# Patient Record
Sex: FEMALE | Race: WHITE | NOT HISPANIC OR LATINO | Employment: UNEMPLOYED | URBAN - METROPOLITAN AREA
[De-identification: names, ages, dates, MRNs, and addresses within clinical notes are randomized per-mention and may not be internally consistent; named-entity substitution may affect disease eponyms.]

---

## 2020-02-09 ENCOUNTER — HOSPITAL ENCOUNTER (EMERGENCY)
Facility: HOSPITAL | Age: 20
Discharge: HOME | End: 2020-02-09
Attending: EMERGENCY MEDICINE | Admitting: EMERGENCY MEDICINE
Payer: COMMERCIAL

## 2020-02-09 ENCOUNTER — APPOINTMENT (EMERGENCY)
Dept: RADIOLOGY | Facility: HOSPITAL | Age: 20
End: 2020-02-09
Payer: COMMERCIAL

## 2020-02-09 VITALS
DIASTOLIC BLOOD PRESSURE: 81 MMHG | HEIGHT: 64 IN | WEIGHT: 175 LBS | RESPIRATION RATE: 16 BRPM | BODY MASS INDEX: 29.88 KG/M2 | HEART RATE: 81 BPM | OXYGEN SATURATION: 99 % | SYSTOLIC BLOOD PRESSURE: 137 MMHG | TEMPERATURE: 98.4 F

## 2020-02-09 DIAGNOSIS — M25.531 RIGHT WRIST PAIN: Primary | ICD-10-CM

## 2020-02-09 PROCEDURE — 99283 EMERGENCY DEPT VISIT LOW MDM: CPT

## 2020-02-09 PROCEDURE — 99284 EMERGENCY DEPT VISIT MOD MDM: CPT | Performed by: EMERGENCY MEDICINE

## 2020-02-09 PROCEDURE — 73110 X-RAY EXAM OF WRIST: CPT

## 2020-02-09 PROCEDURE — 29125 APPL SHORT ARM SPLINT STATIC: CPT | Performed by: EMERGENCY MEDICINE

## 2020-02-10 NOTE — ED PROVIDER NOTES
History  Chief Complaint   Patient presents with    Wrist Pain     pt c/o right wrist pain that began a few days ago  denies injury  Ibuprofen taken approx 1 hour PTA  Right wrist pain over the past few days without any obvious injury denies any repetitive motions she has some mild swelling to the right wrist joint without any erythema warmth or sign of infection there is decreased range of motion secondary to pain pulses in gross sensation are intact      History provided by:  Patient  Medical Problem   Location:  Right wrist  Quality:  Aching pain  Severity:  Moderate  Onset quality:  Gradual  Timing:  Constant  Progression:  Unchanged  Chronicity:  New  Context:  Right wrist pain no obvious injury  Relieved by:  Nothing  Worsened by: Movement      Prior to Admission Medications   Prescriptions Last Dose Informant Patient Reported? Taking? Norgestimate-Eth Estradiol (MONO-LINYAH PO)   Yes Yes   Sig: Take by mouth      Facility-Administered Medications: None       No past medical history on file  Past Surgical History:   Procedure Laterality Date    ADENOIDECTOMY      HERNIA REPAIR      TONSILLECTOMY      WISDOM TOOTH EXTRACTION         No family history on file  I have reviewed and agree with the history as documented  Social History     Tobacco Use    Smoking status: Never Smoker    Smokeless tobacco: Never Used   Substance Use Topics    Alcohol use: Yes    Drug use: Never        Review of Systems   Musculoskeletal: Positive for arthralgias and joint swelling  All other systems reviewed and are negative  Physical Exam  Physical Exam   Constitutional: She is oriented to person, place, and time  She appears well-developed and well-nourished  No distress  HENT:   Head: Normocephalic and atraumatic  Eyes: Pupils are equal, round, and reactive to light  EOM are normal  Right eye exhibits no discharge  Left eye exhibits no discharge  Neck: Neck supple  No JVD present   No tracheal deviation present  Cardiovascular: Normal rate, regular rhythm and intact distal pulses  Pulmonary/Chest: Effort normal and breath sounds normal  No stridor  No respiratory distress  She has no wheezes  Abdominal: Soft  Bowel sounds are normal  There is no tenderness  Musculoskeletal: She exhibits edema and tenderness  She exhibits no deformity  Mild right wrist swelling with decreased range of motion secondary to pain no erythema warmth or sign of infection   Neurological: She is alert and oriented to person, place, and time  No cranial nerve deficit or sensory deficit  She exhibits normal muscle tone  Coordination normal    Skin: Skin is warm and dry  No rash noted  She is not diaphoretic  Psychiatric: She has a normal mood and affect  Her behavior is normal  Thought content normal    Nursing note and vitals reviewed  Vital Signs  ED Triage Vitals [02/09/20 2043]   Temperature Pulse Respirations Blood Pressure SpO2   98 4 °F (36 9 °C) 81 16 137/81 99 %      Temp src Heart Rate Source Patient Position - Orthostatic VS BP Location FiO2 (%)   -- -- Sitting Left arm --      Pain Score       8           Vitals:    02/09/20 2043   BP: 137/81   Pulse: 81   Patient Position - Orthostatic VS: Sitting         Visual Acuity      ED Medications  Medications - No data to display    Diagnostic Studies  Results Reviewed     None                 XR wrist 3+ views RIGHT   ED Interpretation by Kiah Chahal DO (02/09 2109)   No acute fracture or dislocation                 Procedures  Orthopedic injury treatment  Date/Time: 2/9/2020 9:18 PM  Performed by: Kiah Chahal DO  Authorized by: Kiah Chahal DO     Patient Location:  ED  Injury location:  Wrist  Location details:  Right wrist  Injury type:   Soft tissue  Neurovascular status: Neurovascularly intact    Distal perfusion: normal    Neurological function: normal    Range of motion: reduced    Skeletal traction used?: No    Immobilization:  Splint  Splint type:  Wrist  Supplies used:  Ortho-Glass  Neurovascular status: Neurovascularly intact    Distal perfusion: normal    Neurological function: normal    Range of motion: unchanged    Patient tolerance:  Patient tolerated the procedure well with no immediate complications             ED Course                               MDM      Disposition  Final diagnoses:   Right wrist pain     Time reflects when diagnosis was documented in both MDM as applicable and the Disposition within this note     Time User Action Codes Description Comment    2/9/2020  9:19 PM Manan, Nunu [facility] Right wrist pain       ED Disposition     ED Disposition Condition Date/Time Comment    Discharge Stable Sun Feb 9, 2020  9:19 PM Nasir Blackmon discharge to home/self care  Follow-up Information     Follow up With Specialties Details Why Contact Info Additional [address] [facility] Specialists [facility] Orthopedic Surgery In 1 week If symptoms worsen [address] [phone]  [address] [place] [address] Specialists [address], [address]          Patient's Medications   Discharge Prescriptions    No medications on file     No discharge procedures on file      ED Provider  Electronically Signed by           Husam Smith DO  02/09/20 1380

## 2021-03-02 ENCOUNTER — HOSPITAL ENCOUNTER (EMERGENCY)
Facility: HOSPITAL | Age: 21
Discharge: HOME | End: 2021-03-02
Attending: EMERGENCY MEDICINE | Admitting: EMERGENCY MEDICINE
Payer: COMMERCIAL

## 2021-03-02 VITALS
RESPIRATION RATE: 20 BRPM | DIASTOLIC BLOOD PRESSURE: 85 MMHG | TEMPERATURE: 98.1 F | SYSTOLIC BLOOD PRESSURE: 133 MMHG | OXYGEN SATURATION: 99 % | WEIGHT: 153 LBS | BODY MASS INDEX: 26.12 KG/M2 | HEIGHT: 64 IN | HEART RATE: 71 BPM

## 2021-03-02 DIAGNOSIS — K08.89 DENTALGIA: Primary | ICD-10-CM

## 2021-03-02 DIAGNOSIS — K02.9 DENTAL CARIES: ICD-10-CM

## 2021-03-02 PROCEDURE — 96372 THER/PROPH/DIAG INJ SC/IM: CPT

## 2021-03-02 PROCEDURE — 99283 EMERGENCY DEPT VISIT LOW MDM: CPT

## 2021-03-02 PROCEDURE — 99284 EMERGENCY DEPT VISIT MOD MDM: CPT | Performed by: PHYSICIAN ASSISTANT

## 2021-03-02 RX ORDER — AMOXICILLIN 500 MG/1
500 CAPSULE ORAL 3 TIMES DAILY
Qty: 30 CAPSULE | Refills: 0 | Status: SHIPPED | OUTPATIENT
Start: 2021-03-02 | End: 2021-03-12

## 2021-03-02 RX ORDER — IBUPROFEN 600 MG/1
600 TABLET ORAL EVERY 6 HOURS PRN
Qty: 30 TABLET | Refills: 0 | Status: SHIPPED | OUTPATIENT
Start: 2021-03-02

## 2021-03-02 RX ORDER — KETOROLAC TROMETHAMINE 30 MG/ML
30 INJECTION, SOLUTION INTRAMUSCULAR; INTRAVENOUS ONCE
Status: COMPLETED | OUTPATIENT
Start: 2021-03-02 | End: 2021-03-02

## 2021-03-02 RX ADMIN — KETOROLAC TROMETHAMINE 30 MG: 30 INJECTION, SOLUTION INTRAMUSCULAR; INTRAVENOUS at 17:57

## 2021-03-02 NOTE — DISCHARGE INSTRUCTIONS
Soft foods  Take motrin as directed  Take amoxicillin 3 times a day for next 10 days  Keep appointment with dentist next week

## 2021-03-02 NOTE — ED PROVIDER NOTES
History  Chief Complaint   Patient presents with    Dental Pain     patient presents to the ED with c/o dental pain on left side, states took tylenol 3 hours ago      Patient is a 18 y/o F that presents to the ED with left upper dental pain for 2 days  She has been taking tylenol for the pain, but it doesn't help  She has an appointment with her dentist next week and was told to call the family doctor for pain  Family doctor told patient to go to the ED  She denies fevers, chills  No trouble breathing or swallowing  No facial swelling  History provided by:  Patient  Dental Pain  Location:  Upper  Upper teeth location:  15/ZARA 2nd molar  Quality:  Aching  Severity:  Moderate  Onset quality:  Gradual  Duration:  2 days  Timing:  Constant  Progression:  Worsening  Chronicity:  New  Context: dental caries and poor dentition    Relieved by:  Nothing  Worsened by:  Nothing  Ineffective treatments:  Acetaminophen  Associated symptoms: neck swelling    Associated symptoms: no congestion, no difficulty swallowing, no drooling, no facial pain, no facial swelling, no fever, no gum swelling, no headaches and no oral lesions    Risk factors: lack of dental care        Prior to Admission Medications   Prescriptions Last Dose Informant Patient Reported? Taking? Norgestimate-Eth Estradiol (MONO-LINYAH PO)   Yes No   Sig: Take by mouth      Facility-Administered Medications: None       History reviewed  No pertinent past medical history  Past Surgical History:   Procedure Laterality Date    ADENOIDECTOMY      HERNIA REPAIR      TONSILLECTOMY      WISDOM TOOTH EXTRACTION         History reviewed  No pertinent family history  I have reviewed and agree with the history as documented  E-Cigarette/Vaping     E-Cigarette/Vaping Substances     Social History     Tobacco Use    Smoking status: Never Smoker    Smokeless tobacco: Never Used   Substance Use Topics    Alcohol use:  Yes    Drug use: Never Review of Systems   Constitutional: Negative for chills and fever  HENT: Positive for dental problem  Negative for congestion, drooling, facial swelling, mouth sores and trouble swallowing  Gastrointestinal: Negative for nausea and vomiting  Skin: Negative for color change and rash  Neurological: Negative for dizziness and headaches  All other systems reviewed and are negative  Physical Exam  Physical Exam  Vitals signs and nursing note reviewed  Constitutional:       General: She is not in acute distress  Appearance: Normal appearance  She is well-developed and well-groomed  She is not ill-appearing  HENT:      Head: Normocephalic and atraumatic  Jaw: There is normal jaw occlusion  No swelling  Right Ear: Hearing and tympanic membrane normal       Left Ear: Hearing and tympanic membrane normal       Nose: Nose normal       Mouth/Throat:      Mouth: Mucous membranes are moist       Dentition: Abnormal dentition  Dental caries present  Comments: No facial swelling  Eyes:      Conjunctiva/sclera: Conjunctivae normal       Pupils: Pupils are equal    Neck:      Musculoskeletal: Normal range of motion and neck supple  Cardiovascular:      Rate and Rhythm: Normal rate and regular rhythm  Heart sounds: Normal heart sounds  Pulmonary:      Effort: Pulmonary effort is normal       Breath sounds: Normal breath sounds  No wheezing, rhonchi or rales  Musculoskeletal: Normal range of motion  Right lower leg: No edema  Left lower leg: No edema  Lymphadenopathy:      Head:      Right side of head: No submental or submandibular adenopathy  Left side of head: No submental or submandibular adenopathy  Cervical: No cervical adenopathy  Skin:     General: Skin is warm and dry  Coloration: Skin is not pale  Findings: No rash  Neurological:      General: No focal deficit present        Mental Status: She is alert and oriented to person, place, and time  Psychiatric:         Mood and Affect: Mood normal          Behavior: Behavior is cooperative  Vital Signs  ED Triage Vitals   Temperature Pulse Respirations Blood Pressure SpO2   03/02/21 1735 03/02/21 1735 03/02/21 1735 03/02/21 1735 03/02/21 1735   98 1 °F (36 7 °C) 71 20 133/85 99 %      Temp Source Heart Rate Source Patient Position - Orthostatic VS BP Location FiO2 (%)   03/02/21 1735 03/02/21 1735 -- -- --   Temporal Monitor         Pain Score       03/02/21 1732       Worst Possible Pain           Vitals:    03/02/21 1735   BP: 133/85   Pulse: 71         Visual Acuity      ED Medications  Medications   ketorolac (TORADOL) injection 30 mg (30 mg Intramuscular Given 3/2/21 1757)       Diagnostic Studies  Results Reviewed     None                 No orders to display              Procedures  Procedures         ED Course                                           MDM  Number of Diagnoses or Management Options  Dental caries: new and does not require workup  Dentalgia: new and does not require workup  Diagnosis management comments: Patient with dental pain, has appt with dentist next week, will prescribe abx and motrin and advised f/u with dentist      Patient Progress  Patient progress: stable      Disposition  Final diagnoses:   [address] caries     Time reflects when diagnosis was documented in both MDM as applicable and the Disposition within this note     Time User Action Codes Description Comment    3/2/2021  5:42 PM Thea Garsia [[id number]] [address]     3/2/2021  5:42 PM Deandre Chiu [[id number]] Dental caries       ED Disposition     ED Disposition Condition Date/Time Comment    Discharge Stable Tue Mar 2, 2021  5:42 PM Alka Fournier discharge to home/self care              Follow-up Information     Follow up With Specialties Details Why Ace  Call  For recheck [address]  [place] [address]  [phone]          Patient's Medications   Discharge Prescriptions    AMOXICILLIN (AMOXIL) 500 MG CAPSULE    Take 1 capsule (500 mg total) by mouth 3 (three) times a day for 10 days       Start Date: 3/2/2021  End Date: 3/12/2021       Order Dose: 500 mg       Quantity: 30 capsule    Refills: 0    IBUPROFEN (MOTRIN) 600 MG TABLET    Take 1 tablet (600 mg total) by mouth every 6 (six) hours as needed for mild pain       Start Date: 3/2/2021  End Date: --       Order Dose: 600 mg       Quantity: 30 tablet    Refills: 0     No discharge procedures on file      PDMP Review     None          ED Provider  Electronically Signed by           Deni Soni PA-C  03/02/21 9989

## 2021-10-26 ENCOUNTER — OFFICE VISIT (OUTPATIENT)
Dept: OBGYN CLINIC | Facility: CLINIC | Age: 21
End: 2021-10-26
Payer: COMMERCIAL

## 2021-10-26 VITALS
HEIGHT: 64 IN | DIASTOLIC BLOOD PRESSURE: 74 MMHG | SYSTOLIC BLOOD PRESSURE: 112 MMHG | WEIGHT: 144.4 LBS | BODY MASS INDEX: 24.65 KG/M2

## 2021-10-26 DIAGNOSIS — M25.462 EFFUSION OF LEFT KNEE: Primary | ICD-10-CM

## 2021-10-26 PROCEDURE — 99203 OFFICE O/P NEW LOW 30 MIN: CPT | Performed by: PHYSICIAN ASSISTANT

## 2021-11-09 ENCOUNTER — HOSPITAL ENCOUNTER (OUTPATIENT)
Dept: MRI IMAGING | Facility: HOSPITAL | Age: 21
Discharge: HOME | End: 2021-11-09
Payer: COMMERCIAL

## 2021-11-09 DIAGNOSIS — M25.462 EFFUSION OF LEFT KNEE: ICD-10-CM

## 2021-11-09 PROCEDURE — G1004 CDSM NDSC: HCPCS

## 2021-11-09 PROCEDURE — 73721 MRI JNT OF LWR EXTRE W/O DYE: CPT

## 2021-11-12 ENCOUNTER — OFFICE VISIT (OUTPATIENT)
Dept: OBGYN CLINIC | Facility: CLINIC | Age: 21
End: 2021-11-12
Payer: COMMERCIAL

## 2021-11-12 VITALS
WEIGHT: 147 LBS | DIASTOLIC BLOOD PRESSURE: 70 MMHG | HEIGHT: 64 IN | BODY MASS INDEX: 25.1 KG/M2 | SYSTOLIC BLOOD PRESSURE: 110 MMHG

## 2021-11-12 DIAGNOSIS — M22.2X2 PATELLOFEMORAL DISORDER OF LEFT KNEE: Primary | ICD-10-CM

## 2021-11-12 PROCEDURE — 99213 OFFICE O/P EST LOW 20 MIN: CPT | Performed by: ORTHOPAEDIC SURGERY

## 2021-12-03 ENCOUNTER — TELEPHONE (OUTPATIENT)
Dept: OBGYN CLINIC | Facility: OTHER | Age: 21
End: 2021-12-03

## 2021-12-22 ENCOUNTER — TELEPHONE (OUTPATIENT)
Dept: OBGYN CLINIC | Facility: HOSPITAL | Age: 21
End: 2021-12-22

## 2021-12-28 ENCOUNTER — TELEPHONE (OUTPATIENT)
Dept: OBGYN CLINIC | Facility: HOSPITAL | Age: 21
End: 2021-12-28

## 2022-01-28 ENCOUNTER — OFFICE VISIT (OUTPATIENT)
Dept: OBGYN CLINIC | Facility: CLINIC | Age: 22
End: 2022-01-28
Payer: COMMERCIAL

## 2022-01-28 VITALS
DIASTOLIC BLOOD PRESSURE: 74 MMHG | BODY MASS INDEX: 27.31 KG/M2 | WEIGHT: 160 LBS | HEIGHT: 64 IN | SYSTOLIC BLOOD PRESSURE: 122 MMHG

## 2022-01-28 DIAGNOSIS — M22.2X2 PATELLOFEMORAL DISORDER OF LEFT KNEE: ICD-10-CM

## 2022-01-28 DIAGNOSIS — M67.52 PLICA OF KNEE, LEFT: Primary | ICD-10-CM

## 2022-01-28 DIAGNOSIS — M25.462 EFFUSION OF LEFT KNEE: ICD-10-CM

## 2022-01-28 PROCEDURE — 99214 OFFICE O/P EST MOD 30 MIN: CPT | Performed by: ORTHOPAEDIC SURGERY

## 2022-01-28 NOTE — PATIENT INSTRUCTIONS
Medial plica excision       Discussed that she is tender on the medial plica and that may be causing some of her pain, however there is a chance that it does not get better

## 2022-01-28 NOTE — PROGRESS NOTES
Ortho Sports Medicine Knee Follow Up Visit     Assesment:   19 y o  female left knee medial plica tenderness with patellofemoral pain syndrome    Plan:    Conservative treatment:    Ice to knee for 20 minutes at least 1-2 times daily  Advised to try a new PT for ROM/strengthening to knee, hip and core  Imaging: All imaging from today was reviewed by myself and explained to the patient  Injection:    No Injection planned at this time  Surgery:     No surgery is recommended at this point, continue with conservative treatment plan as noted  We may consider doing a surgery, but she would like think about it at this time  All risks and benefits to medial plica excision arthroscopically were discussed with patient and she voiced understanding  We also discussed that it may not fully resolve her pain during surgery due to the unsure nature of whether not her  Medial plica is her true cause of pain  I explained to her that she may still of pain if it is underlying patellofemoral syndrome and that would only get better with physical therapy  Discussed that she is tender on the medial plica and that may be causing some of her pain, however there is a chance that it does not get better  Follow up:    No follow-ups on file  Chief Complaint   Patient presents with    Left Knee - Follow-up     History of Present Illness: The patient is returns for follow up of left knee patellofemoral syndrome  Since the prior visit, She reports no improvement  Patient states that attending physical therapy has made her knee pain worse  Patient describes this pain is deep anterior knee pain that occasionally wraps around th knee  Patient feels that no matter what she is doing whether that is sitting, standing, walking, keeping her leg straight, keeping her leg bent she always experiences pain  Pain is located anterior, deep  Pain is improved by rest, ice, NSAIDS and physical therapy   Pain is aggravated by stairs, squatting, weight bearing, walking, sitting and standing  Symptoms include clicking and catching  The patient has tried rest, ice, NSAIDS and physical therapy  Knee Surgical History:  None    Past Medical, Social and Family History:  History reviewed  No pertinent past medical history  Past Surgical History:   Procedure Laterality Date    ADENOIDECTOMY      HERNIA REPAIR      TONSILLECTOMY      WISDOM TOOTH EXTRACTION       Allergies   Allergen Reactions    Codeine     Flonase [Fluticasone] Other (See Comments)     Sensitivity     Latex      Current Outpatient Medications on File Prior to Visit   Medication Sig Dispense Refill    ibuprofen (MOTRIN) 600 mg tablet Take 1 tablet (600 mg total) by mouth every 6 (six) hours as needed for mild pain 30 tablet 0    Norgestimate-Eth Estradiol (MONO-LINYAH PO) Take by mouth       No current facility-administered medications on file prior to visit  Social History     Socioeconomic History    Marital status: Single     Spouse name: Not on file    Number of children: Not on file    Years of education: Not on file    Highest education level: Not on file   Occupational History    Not on file   Tobacco Use    Smoking status: Never Smoker    Smokeless tobacco: Never Used   Vaping Use    Vaping Use: Never used   Substance and Sexual Activity    Alcohol use: Yes    Drug use: Never    Sexual activity: Yes   Other Topics Concern    Not on file   Social History Narrative    Not on file     Social Determinants of Health     Financial Resource Strain: Not on file   Food Insecurity: Not on file   Transportation Needs: Not on file   Physical Activity: Not on file   Stress: Not on file   Social Connections: Not on file   Intimate Partner Violence: Not on file   Housing Stability: Not on file     I have reviewed the past medical, surgical, social and family history, medications and allergies as documented in the EMR      Review of systems: VIVIANA hernandez negative other than that noted in the HPI  Constitutional: Negative for fatigue and fever  Physical Exam:    Blood pressure 122/74, height 5' 3 5" (1 613 m), weight 72 6 kg (160 lb)  General/Constitutional: NAD, well developed, well nourished  HENT: Normocephalic, atraumatic  CV: Intact distal pulses, regular rate  Resp: No respiratory distress or labored breathing  Lymphatic: No lymphadenopathy palpated  Neuro: Alert and Oriented x 3, no focal deficits  Psych: Normal mood, normal affect, normal judgement, normal behavior  Skin: Warm, dry, no rashes, no erythema    Knee Exam (focused): RIGHT LEFT   ROM:   0-130 0-130   Palpation: Effusion negative negative     MJL tenderness Negative Negative     LJL tenderness Negative Negative   Meniscus:  Bentley Negative Negative    Apley's Compression Negative Negative   Instability: Varus stable stable     Valgus stable stable   Special Tests: Lachman Negative Negative     Posterior drawer Negative Negative     Anterior drawer Negative Negative     Pivot shift not tested not tested     Dial not tested not tested   Patella: Palpation no tenderness no tenderness     Mobility 1/4 1/4     Apprehension Negative Negative   Other: Single leg 1/4 squat not tested not tested      Left knee TTP medial plica     LE NV Exam: +2 DP/PT pulses bilaterally  Sensation intact to light touch L2-S1 bilaterally    No calf tenderness to palpation bilaterally    Knee Imaging    No imaging was performed today    Scribe Attestation    I,:  Patricia Chase am acting as a scribe while in the presence of the attending physician :       I,:  Tessie Cárdenas,  personally performed the services described in this documentation    as scribed in my presence :

## 2022-01-31 ENCOUNTER — TELEPHONE (OUTPATIENT)
Dept: OBGYN CLINIC | Facility: HOSPITAL | Age: 22
End: 2022-01-31

## 2022-01-31 NOTE — TELEPHONE ENCOUNTER
Patient sees Dr Jessie Guzman  Patient is calling stating when she was in the office last week surgery was discussed  She has thought about it and would like to move forward  She would like a callback to set up surgery        CB: [phone]

## 2022-02-04 ENCOUNTER — OFFICE VISIT (OUTPATIENT)
Dept: OBGYN CLINIC | Facility: CLINIC | Age: 22
End: 2022-02-04
Payer: COMMERCIAL

## 2022-02-04 VITALS
HEIGHT: 64 IN | DIASTOLIC BLOOD PRESSURE: 82 MMHG | SYSTOLIC BLOOD PRESSURE: 122 MMHG | BODY MASS INDEX: 27.31 KG/M2 | WEIGHT: 160 LBS

## 2022-02-04 DIAGNOSIS — M67.52 PLICA OF KNEE, LEFT: Primary | ICD-10-CM

## 2022-02-04 DIAGNOSIS — M22.2X2 PATELLOFEMORAL DISORDER OF LEFT KNEE: ICD-10-CM

## 2022-02-04 PROCEDURE — 99214 OFFICE O/P EST MOD 30 MIN: CPT | Performed by: ORTHOPAEDIC SURGERY

## 2022-02-04 RX ORDER — CHLORHEXIDINE GLUCONATE 0.12 MG/ML
15 RINSE ORAL ONCE
Status: CANCELLED | OUTPATIENT
Start: 2022-03-09 | End: 2022-02-04

## 2022-02-04 RX ORDER — CEFAZOLIN SODIUM 2 G/50ML
2000 SOLUTION INTRAVENOUS ONCE
Status: CANCELLED | OUTPATIENT
Start: 2022-03-09 | End: 2022-02-04

## 2022-02-04 NOTE — PROGRESS NOTES
Ortho Sports Medicine Knee Visit     Assesment:   left knee medial plica tenderness with patellofemoral pain syndrome     Plan:    Conservative treatment:    Ice to knee for 20 minutes at least 1-2 times daily  OTC NSAIDS prn for pain  Declined CSI and restarting physical therapy  Patient wishes to proceed with surgical intervention medial plica excision with possible medial meniscal repair vs debridement of left knee  Consent signed    Imaging:    No imaging was available for review today  Injection:    No Injection planned at this time  Surgery: All risks and benefits to left knee medial plica excision arthroscopically with possible medial meniscal repair vs debridement were discussed with patient and she voiced understanding  We also discussed that it may not fully resolve her pain during surgery due to the unsure nature of whether not her  Medial plica is her true cause of pain  I explained to her that she may still of pain if it is underlying patellofemoral syndrome and that would only get better with physical therapy   She voiced understanding of this but would like to proceed forward with medial plica excision at this time she does not want to go through any injections or further nonsurgical care           History of Present Illness: The patient is returns for follow up of her left knee  Discussed at last appt surgical excision of medial plica to resolve current issues  Patient describes this pain is deep anterior and medial knee pain that occasionally wraps around the knee  Patient feels that no matter what she is doing whether that is sitting, standing, walking, keeping her leg straight, keeping her leg bent she always experiences pain  Pain is improved by rest, ice and NSAIDS  Pain is aggravated by stairs, squatting, weight bearing, walking, sitting and standing  Symptoms include clicking and catching  The patient has tried rest, ice, NSAIDS and physical therapy  I have reviewed the past medical, surgical, social and family history, medications and allergies as documented in the EMR  Review of systems: ROS is negative other than that noted in the HPI  Constitutional: Negative for fatigue and fever  Cardiovascular: Negative for chest pain  Pulmonary: negative for shortness of breath    PMH/PSH:  History reviewed  No pertinent past medical history  Past Surgical History:   Procedure Laterality Date    ADENOIDECTOMY      HERNIA REPAIR      TONSILLECTOMY      WISDOM TOOTH EXTRACTION          Physical Exam:    Blood pressure 122/82, height 5' 3 5" (1 613 m), weight 72 6 kg (160 lb)  General/Constitutional: NAD, well developed, well nourished  HENT: Normocephalic, atraumatic  CV: Intact distal pulses, regular rate  Resp: No respiratory distress or labored breathing  Lymphatic: No lymphadenopathy palpated  Neuro: Alert and Oriented x 3, no focal deficits  Psych: Normal mood, normal affect, normal judgement, normal behavior  Skin: Warm, dry, no rashes, no erythema       Knee Exam (focused):                   RIGHT LEFT   ROM:   0-130 0-130   Palpation: Effusion negative negative     MJL tenderness Negative Negative     LJL tenderness Negative Negative   Instability: Varus stable stable     Valgus stable stable   Special Tests: Lachman Negative Negative     Posterior drawer Negative Negative     Anterior drawer Negative Negative     Pivot shift not tested not tested     Dial not tested not tested   Patella: Palpation No tenderness Medial plica, medial facet      Mobility 1/4 1/4     Apprehension Negative Negative   Other: Single leg 1/4 squat not tested not tested      LE NV Exam: +2 DP/PT pulses bilaterally  Sensation intact to light touch L2-S1 bilaterally    No calf tenderness to palpation bilaterally      Knee Imaging    No imaging was performed today    Scribe Attestation    I,:  Migue Drake am acting as a scribe while in the presence of the attending physician :       I,:  Ludin [facility], DO personally performed the services described in this documentation    as scribed in my presence :

## 2022-03-02 RX ORDER — ESCITALOPRAM OXALATE 5 MG/1
10 TABLET ORAL
COMMUNITY

## 2022-03-02 NOTE — PRE-PROCEDURE INSTRUCTIONS
Pre-Surgery Instructions:   Medication Instructions    escitalopram (LEXAPRO) 5 mg tablet Instructed patient per Anesthesia Guidelines  takes hs    ibuprofen (MOTRIN) 600 mg tablet Instructed patient per Anesthesia Guidelines  holding preop    Norgestimate-Eth Estradiol (MONO-LINYAH PO) Instructed patient per Anesthesia Guidelines  takes hs    You will receive a phone call from hospital for arrival time  Please call surgeons office if any changes in your condition  Wear easy on/off clothing; consider type of surgery;  Valuables, jewelry, piercing's please keep at home  **COVID-19  education/surgical guidelines  Updated covid    Visitation policy  Please: No contact lenses or eye make up, artificial eyelashes    Please bring crutches  Please secure transportation     Follow pre surgery showering or cleaning instructions as  Reviewed by nurse or surgeons office      Questions answered and concerns addressed

## 2022-03-08 ENCOUNTER — ANESTHESIA EVENT (OUTPATIENT)
Dept: PERIOP | Facility: HOSPITAL | Age: 22
End: 2022-03-08
Payer: COMMERCIAL

## 2022-03-09 ENCOUNTER — ANESTHESIA (OUTPATIENT)
Dept: PERIOP | Facility: HOSPITAL | Age: 22
End: 2022-03-09
Payer: COMMERCIAL

## 2022-03-09 ENCOUNTER — HOSPITAL ENCOUNTER (OUTPATIENT)
Facility: HOSPITAL | Age: 22
Setting detail: OUTPATIENT SURGERY
Discharge: HOME | End: 2022-03-09
Attending: ORTHOPAEDIC SURGERY | Admitting: ORTHOPAEDIC SURGERY
Payer: COMMERCIAL

## 2022-03-09 ENCOUNTER — TELEPHONE (OUTPATIENT)
Dept: OBGYN CLINIC | Facility: HOSPITAL | Age: 22
End: 2022-03-09

## 2022-03-09 VITALS
DIASTOLIC BLOOD PRESSURE: 73 MMHG | HEART RATE: 78 BPM | RESPIRATION RATE: 22 BRPM | SYSTOLIC BLOOD PRESSURE: 121 MMHG | OXYGEN SATURATION: 96 % | WEIGHT: 160.05 LBS | TEMPERATURE: 100.2 F | HEIGHT: 64 IN | BODY MASS INDEX: 27.33 KG/M2

## 2022-03-09 DIAGNOSIS — Z98.890 S/P ARTHROSCOPIC SURGERY OF LEFT KNEE: Primary | ICD-10-CM

## 2022-03-09 PROBLEM — F41.9 ANXIETY: Status: ACTIVE | Noted: 2022-03-09

## 2022-03-09 LAB
EXT PREGNANCY TEST URINE: NEGATIVE
EXT. CONTROL: NORMAL

## 2022-03-09 PROCEDURE — 29875 ARTHRS KNEE SURG SYNVCT LMTD: CPT | Performed by: ORTHOPAEDIC SURGERY

## 2022-03-09 PROCEDURE — 81025 URINE PREGNANCY TEST: CPT | Performed by: ORTHOPAEDIC SURGERY

## 2022-03-09 PROCEDURE — NC001 PR NO CHARGE: Performed by: ORTHOPAEDIC SURGERY

## 2022-03-09 RX ORDER — ONDANSETRON 2 MG/ML
INJECTION INTRAMUSCULAR; INTRAVENOUS AS NEEDED
Status: DISCONTINUED | OUTPATIENT
Start: 2022-03-09 | End: 2022-03-09

## 2022-03-09 RX ORDER — CEFAZOLIN SODIUM 2 G/50ML
2000 SOLUTION INTRAVENOUS ONCE
Status: COMPLETED | OUTPATIENT
Start: 2022-03-09 | End: 2022-03-09

## 2022-03-09 RX ORDER — OXYCODONE HYDROCHLORIDE 5 MG/1
5 TABLET ORAL EVERY 4 HOURS PRN
Qty: 10 TABLET | Refills: 0 | Status: SHIPPED | OUTPATIENT
Start: 2022-03-09

## 2022-03-09 RX ORDER — METOCLOPRAMIDE HYDROCHLORIDE 5 MG/ML
10 INJECTION INTRAMUSCULAR; INTRAVENOUS ONCE
Status: DISCONTINUED | OUTPATIENT
Start: 2022-03-09 | End: 2022-03-09 | Stop reason: HOSPADM

## 2022-03-09 RX ORDER — FENTANYL CITRATE/PF 50 MCG/ML
25 SYRINGE (ML) INJECTION
Status: DISCONTINUED | OUTPATIENT
Start: 2022-03-09 | End: 2022-03-09 | Stop reason: HOSPADM

## 2022-03-09 RX ORDER — LIDOCAINE HYDROCHLORIDE 10 MG/ML
INJECTION, SOLUTION EPIDURAL; INFILTRATION; INTRACAUDAL; PERINEURAL AS NEEDED
Status: DISCONTINUED | OUTPATIENT
Start: 2022-03-09 | End: 2022-03-09

## 2022-03-09 RX ORDER — KETOROLAC TROMETHAMINE 30 MG/ML
INJECTION, SOLUTION INTRAMUSCULAR; INTRAVENOUS AS NEEDED
Status: DISCONTINUED | OUTPATIENT
Start: 2022-03-09 | End: 2022-03-09

## 2022-03-09 RX ORDER — HYDROMORPHONE HCL/PF 1 MG/ML
0.5 SYRINGE (ML) INJECTION
Status: DISCONTINUED | OUTPATIENT
Start: 2022-03-09 | End: 2022-03-09 | Stop reason: HOSPADM

## 2022-03-09 RX ORDER — MIDAZOLAM HYDROCHLORIDE 2 MG/2ML
INJECTION, SOLUTION INTRAMUSCULAR; INTRAVENOUS AS NEEDED
Status: DISCONTINUED | OUTPATIENT
Start: 2022-03-09 | End: 2022-03-09

## 2022-03-09 RX ORDER — SODIUM CHLORIDE, SODIUM LACTATE, POTASSIUM CHLORIDE, CALCIUM CHLORIDE 600; 310; 30; 20 MG/100ML; MG/100ML; MG/100ML; MG/100ML
INJECTION, SOLUTION INTRAVENOUS CONTINUOUS PRN
Status: DISCONTINUED | OUTPATIENT
Start: 2022-03-09 | End: 2022-03-09

## 2022-03-09 RX ORDER — ASPIRIN 325 MG
325 TABLET, DELAYED RELEASE (ENTERIC COATED) ORAL DAILY
Qty: 30 TABLET | Refills: 0 | Status: SHIPPED | OUTPATIENT
Start: 2022-03-09

## 2022-03-09 RX ORDER — ONDANSETRON 2 MG/ML
4 INJECTION INTRAMUSCULAR; INTRAVENOUS ONCE
Status: DISCONTINUED | OUTPATIENT
Start: 2022-03-09 | End: 2022-03-09 | Stop reason: HOSPADM

## 2022-03-09 RX ORDER — CHLORHEXIDINE GLUCONATE 0.12 MG/ML
15 RINSE ORAL ONCE
Status: DISCONTINUED | OUTPATIENT
Start: 2022-03-09 | End: 2022-03-09 | Stop reason: HOSPADM

## 2022-03-09 RX ORDER — PROPOFOL 10 MG/ML
INJECTION, EMULSION INTRAVENOUS AS NEEDED
Status: DISCONTINUED | OUTPATIENT
Start: 2022-03-09 | End: 2022-03-09

## 2022-03-09 RX ORDER — FENTANYL CITRATE 50 UG/ML
INJECTION, SOLUTION INTRAMUSCULAR; INTRAVENOUS AS NEEDED
Status: DISCONTINUED | OUTPATIENT
Start: 2022-03-09 | End: 2022-03-09

## 2022-03-09 RX ORDER — DEXAMETHASONE SODIUM PHOSPHATE 10 MG/ML
INJECTION, SOLUTION INTRAMUSCULAR; INTRAVENOUS AS NEEDED
Status: DISCONTINUED | OUTPATIENT
Start: 2022-03-09 | End: 2022-03-09

## 2022-03-09 RX ADMIN — LIDOCAINE HYDROCHLORIDE 50 MG: 10 INJECTION, SOLUTION EPIDURAL; INFILTRATION; INTRACAUDAL; PERINEURAL at 09:10

## 2022-03-09 RX ADMIN — FENTANYL CITRATE 25 MCG: 50 INJECTION, SOLUTION INTRAMUSCULAR; INTRAVENOUS at 10:26

## 2022-03-09 RX ADMIN — CEFAZOLIN SODIUM 2000 MG: 2 SOLUTION INTRAVENOUS at 09:05

## 2022-03-09 RX ADMIN — SODIUM CHLORIDE, SODIUM LACTATE, POTASSIUM CHLORIDE, AND CALCIUM CHLORIDE: .6; .31; .03; .02 INJECTION, SOLUTION INTRAVENOUS at 08:45

## 2022-03-09 RX ADMIN — MIDAZOLAM 2 MG: 1 INJECTION INTRAMUSCULAR; INTRAVENOUS at 09:05

## 2022-03-09 RX ADMIN — FENTANYL CITRATE 50 MCG: 50 INJECTION, SOLUTION INTRAMUSCULAR; INTRAVENOUS at 09:28

## 2022-03-09 RX ADMIN — DEXAMETHASONE SODIUM PHOSPHATE 10 MG: 10 INJECTION, SOLUTION INTRAMUSCULAR; INTRAVENOUS at 09:10

## 2022-03-09 RX ADMIN — ONDANSETRON 4 MG: 2 INJECTION INTRAMUSCULAR; INTRAVENOUS at 09:10

## 2022-03-09 RX ADMIN — PROPOFOL 200 MG: 10 INJECTION, EMULSION INTRAVENOUS at 09:10

## 2022-03-09 RX ADMIN — FENTANYL CITRATE 25 MCG: 50 INJECTION, SOLUTION INTRAMUSCULAR; INTRAVENOUS at 10:13

## 2022-03-09 RX ADMIN — KETOROLAC TROMETHAMINE 15 MG: 30 INJECTION, SOLUTION INTRAMUSCULAR; INTRAVENOUS at 09:10

## 2022-03-09 NOTE — ANESTHESIA POSTPROCEDURE EVALUATION
Post-Op Assessment Note    CV Status:  Stable  Pain Score: 0    Pain management: adequate     Mental Status:  Alert and awake   Hydration Status:  Euvolemic   PONV Controlled:  Controlled   Airway Patency:  Patent      Post Op Vitals Reviewed: Yes      Staff: Anesthesiologist, CRNA         No complications documented      BP   152/103   Temp   100 2   Pulse 124   Resp   26   SpO2   100

## 2022-03-09 NOTE — TELEPHONE ENCOUNTER
I called and spoke to patient  She does NOT have an allergy to morphine, just codeine and it is GI sensitivity, not a true allergy  Patient may take the oxycodone  Patient already picked up the medication from the pharmacy  I instructed that if she has nausea or vomiting to give our office a call and I can send Zofran to her pharmacy

## 2022-03-09 NOTE — H&P
Ortho Sports Medicine Knee Visit     Assesment:   left knee medial plica tenderness with patellofemoral pain syndrome     Plan:    Conservative treatment:    Ice to knee for 20 minutes at least 1-2 times daily  OTC NSAIDS prn for pain  Declined CSI and restarting physical therapy  Patient wishes to proceed with surgical intervention medial plica excision with possible medial meniscal repair vs debridement of left knee  Consent signed    Imaging:    No imaging was available for review today  Injection:    No Injection planned at this time  Surgery: All risks and benefits to left knee medial plica excision arthroscopically with possible medial meniscal repair vs debridement were discussed with patient and she voiced understanding  We also discussed that it may not fully resolve her pain during surgery due to the unsure nature of whether not her  Medial plica is her true cause of pain  I explained to her that she may still of pain if it is underlying patellofemoral syndrome and that would only get better with physical therapy   She voiced understanding of this but would like to proceed forward with medial plica excision at this time she does not want to go through any injections or further nonsurgical care           History of Present Illness: The patient is returns for follow up of her left knee  Discussed at last appt surgical excision of medial plica to resolve current issues  Patient describes this pain is deep anterior and medial knee pain that occasionally wraps around the knee  Patient feels that no matter what she is doing whether that is sitting, standing, walking, keeping her leg straight, keeping her leg bent she always experiences pain  Pain is improved by rest, ice and NSAIDS  Pain is aggravated by stairs, squatting, weight bearing, walking, sitting and standing  Symptoms include clicking and catching  The patient has tried rest, ice, NSAIDS and physical therapy  I have reviewed the past medical, surgical, social and family history, medications and allergies as documented in the EMR  Review of systems: ROS is negative other than that noted in the HPI  Constitutional: Negative for fatigue and fever  Cardiovascular: Negative for chest pain  Pulmonary: negative for shortness of breath    PMH/PSH:  Past Medical History:   Diagnosis Date    Multiple body piercings      Past Surgical History:   Procedure Laterality Date    ADENOIDECTOMY      HERNIA REPAIR      as infant    TONSILLECTOMY      2 yrs old   [employer] WISDOM TOOTH EXTRACTION          Physical Exam:    There were no vitals taken for this visit  General/Constitutional: NAD, well developed, well nourished  HENT: Normocephalic, atraumatic  CV: Intact distal pulses, regular rate  Resp: No respiratory distress or labored breathing  Lymphatic: No lymphadenopathy palpated  Neuro: Alert and Oriented x 3, no focal deficits  Psych: Normal mood, normal affect, normal judgement, normal behavior  Skin: Warm, dry, no rashes, no erythema       Knee Exam (focused):                   RIGHT LEFT   ROM:   0-130 0-130   Palpation: Effusion negative negative     MJL tenderness Negative Negative     LJL tenderness Negative Negative   Instability: Varus stable stable     Valgus stable stable   Special Tests: Lachman Negative Negative     Posterior drawer Negative Negative     Anterior drawer Negative Negative     Pivot shift not tested not tested     Dial not tested not tested   Patella: Palpation No tenderness Medial plica, medial facet      Mobility 1/4 1/4     Apprehension Negative Negative   Other: Single leg 1/4 squat not tested not tested      LE NV Exam: +2 DP/PT pulses bilaterally  Sensation intact to light touch L2-S1 bilaterally    No calf tenderness to palpation bilaterally      Knee Imaging    No imaging was performed today    Scribe Attestation    I,:   am acting as a scribe while in the presence of the attending physician :       I,:   personally performed the services described in this documentation    as scribed in my presence :

## 2022-03-09 NOTE — TELEPHONE ENCOUNTER
Patient sees Dr Miki Iglesias and is having surgery today      Marquis Ramos from [facility] is calling because they received a prescriptions for Oxycodone but they have on file that the patient has a morphine allergy        Please advise      CB: [phone]

## 2022-03-09 NOTE — DISCHARGE INSTRUCTIONS
POSTOPERATIVE INSTRUCTIONS following KNEE SURGERY    MEDICATIONS:  · Resume all home medications unless otherwise instructed by your surgeon  · Pain Medication:  Oxycodone 5 mg, 1 tablet every 4 hours as needed  · If you were given a regional anesthetic (nerve block), please begin taking the pain medication as soon as you get home, even if you have minimal or no pain  DO NOT WAIT FOR THE NERVE BLOCK TO WEAR OFF  · Possible side effects include nausea, constipation, and urinary retention  If you experience these side effects, please call our office for assistance  · Pain med refills are authorized only during office hours (8am-4pm, Mon-Fri)  · Anti-Inflammatory:  Ibuprofen 600 mg, 1 tablet every 8 hours for 4 weeks and Tylenol 325 mg, 1-2 tablets every 6 hours for 4 weeks  · Take with food  Stop if you experience nausea, reflux, or stomach pain  · Nausea Medication:  None  · Fill prescription ONLY if you expericnce severe nausea  · Blood Clot Prevention:  Aspirin 325 mg, 1 tablet daily for 3 weeks  · Pump your foot up and down 20 times per hour while you are less mobile  WOUND CARE:  · Keep the dressing clean and dry  Light drainage may occur the first 2 days postop  · You may remove the dressings and get the incision wet in the shower 72 hours after surgery  DO NOT remove steri-strips or sutures  DO NOT immerse the incision under water  Carefully pat the incision dry  If there is wound drainage, re-apply a fresh dry gauze dressing  · Please call our office ([phone]) if you experience either of the following:  · Sudden increase in swelling, redness, or warmth at the surgical site  · Excessive incisional drainage that persists beyond the 3rd day after surgery  · Oral temperature greater than 101 degrees, not relieved with Tylenol  · Shortness of breath, chest pain, nausea, or any other concerning symptoms    SWELLING CONTROL:  · Cold Therapy:   The cold therapy device may be used either continuously or only as needed, according to your preference  Do not let the pad directly touch your skin  Alternatively, apply ice (20 min on, 20 min off) as often as you feel is necessary  · Elevation:  Elevate the entire leg above heart level  Place pillows under your ankle to keep your knee straight  · Compression:  Apply ACE wraps or a thigh-length compression stocking as needed  RANGE OF MOTION:  · You are allowed FULL RANGE OF MOTION as tolerated  IMMOBILIZATION:  · None  You are allowed full range of motion as tolerated  ACTIVITY:   · BEAR FULL WEIGHT AS TOLERATED on the operative leg  Use crutches to assist only as needed  · Using Crutches on Stairs:  Going up, lead with your "good" (nonoperative) leg  Going down, lead with your "bad" (operative) leg  Use a hand rail when available  · Knee Extension:  Place a rolled towel or pillow under your ankle for 20-30 minutes 3-5 times per day  This will help to maintain full knee extension  · Quad Sets:  Sit or lie with your knee straight  Tighten your quadriceps (front thigh) muscle  Hold for 3 seconds, then relax  Repeat 20 times per hour while awake  PHYSICAL THERAPY:  · Begin therapy 3 TO 5 DAYS AFTER SURGERY  You were given a prescription for therapy at your preoperative office visit  If you do not have physical therapy scheduled yet, please call our office for assistance  FOLLOW-UP APPOINTMENT:  · 7-10 days after surgery with:    AGGIE Bedoya [address] [place] [address], [facility], [address]  [phone] ([facility])  [phone] (After-Hours)

## 2022-03-09 NOTE — ANESTHESIA PREPROCEDURE EVALUATION
Procedure:  ARTHROSCOPY KNEE-meniscectomy vs repair possible medial plica excision (Left Knee)    Relevant Problems   NEURO/PSYCH   (+) Anxiety        Physical Exam    Airway    Mallampati score: II  TM Distance: >3 FB  Neck ROM: full     Dental   No notable dental hx     Cardiovascular  Cardiovascular exam normal    Pulmonary  Pulmonary exam normal     Other Findings        Anesthesia Plan  ASA Score- 1     Anesthesia Type- general with ASA Monitors  Additional Monitors:   Airway Plan: LMA  Comment: Npo m/n  Plan Factors-    Chart reviewed  Patient is not a current smoker  Induction- intravenous  Postoperative Plan-     Informed Consent- Anesthetic plan and risks discussed with patient  I personally reviewed this patient with the CRNA  Discussed and agreed on the Anesthesia Plan with the CRNA  Ismael Sin

## 2022-03-10 NOTE — OP NOTE
OPERATIVE REPORT  PATIENT NAME: Yani Grace    :  2000  MRN: [id number]  Pt Location: [facility] OR ROOM 01    SURGERY DATE: 3/9/2022    Surgeon(s) and Role:     * DO Manuel Vallejo Primary    Preop Diagnosis:  Patellofemoral disorder of left knee [P29 7Q3]  Plica of knee, left [Y51 33]    Post-Op Diagnosis Codes:     * Patellofemoral disorder of left knee [V41 9B1]     * Plica of knee, left [E07 53]    Procedure(s) (LRB):  ARTHROSCOPY KNEE (Left)  SYNOVECTOMY (Left)    Specimen(s):  * No specimens in log *    Estimated Blood Loss:   Minimal    Drains:  * No LDAs found *    Anesthesia Type:   General    Operative Indications:  Patellofemoral disorder of left knee [O95 9C7]  Plica of knee, left [X60 86]    Complications:   None    Procedure and Technique:     Operation:  Surgical arthroscopy of the Left knee with   Medial plica excision and synovectomy     Operative Modifiers:  None      Second Assistant: Dean Salazar     Anesthesia:   general     Tourniquet Time:  20 minutes     Blood Loss:  Minimal      Indications: Ms Fiorella Christina is a 23 y o  female  With symptomatic medial plica syndrome  An MRI was obtained a revealed no internal abnormalities but she continued to have pain clinically over the medial plica after physical therapy   Due to the patient's MRI findings, active lifestyle, and lack of improvement with a conservative approach, it was recommended that they proceed forward with arthroscopic surgical management of this problem  We reviewed risks and benefits of surgery and a decision was made to proceed with surgery to address the  Medial plica            Findings:       Examination under anesthesia of the operative Left knee revealed a range of motion of 0-130 degrees  Posterior drawer testing was negative  Lachman testing was negative  Pivot shift testing was negative,  Collateral ligament stability testing revealed no laxity with valgus or varus stresses   With respect to posterolateral corner testing, dial testing at 30 and at 90 degrees was symmetric to the contralateral knee  Arthroscopic evaluation of the knee revealed the following:     Medial meniscus: There was a complex, multidirectional tear of the medial meniscus      Medial femoral condyle:Grade 0 chondral defects  Medial tibial plateau: Grade  0 chondral defects  Anterior cruciate ligament: Normal appearance  Posterior cruciate ligament: Normal appearance  Lateral meniscus: No tears  Lateral femoral condyle: Grade 0 chondral defects  Lateral tibial plateau: Grade0 chondral defects     Medial and lateral gutters: No loose bodies  Patella: Grade 0 chondral defects  Trochlea: Grade 0 chondral defects  Medial plica:  large medial plica and synovium         Procedure:  In the pre-operative holding area, the patient identified the correct operative extremity and I marked that extremity with my initials, using a permanent marker  The patient was brought to the operating room and positioned supine  Following satisfactory induction of anesthesia, the Left knee was prepped and draped in the usual sterile fashion for surgical arthroscopy of the Left knee  Before any surgical instrumentation was passed to me by the surgical technician, a formalized time-out occurred, which involves the surgeon, circulating nurse, and anesthesia staff all verifying the correct operative extremity  My initials were visible on the prepped and draped operative field  The anatomic landmarks of the anteromedial and anterolateral portals were marked  The anterolateral portal was established with a scalpel  The arthroscope was introduced through this portal  Under direct visualization, the anteromedial portal was established with a localizing needle followed by a scalpel   A probe was then introduced into the anteromedial portal  A systematic diagnostic arthroscopy evaluated the following:  medial compartment, notch, lateral compartment, patellofemoral compartment, medial gutter, and lateral gutter  no meniscal tears were seen medially or laterally     there was a large hypertrophic medial plica with some evidence of fraying of the medial femoral condyle  This was resected with a motorized shaver device  Medial plica and medial synovium of the anterior joint was resected  This completed the medial plica excision and synovectomy  There was no additional pathology  All particulate debris was removed  The knee was copiously rinsed and then drained  The portals were closed with an interrupted 4-0 monocryl absorbable suture  The skin was cleansed with sterile saline and dried before Steri-Strips were applied  Finally, a sterile dressing was secured by Webril and an Ace wrap        I was present for the entire procedure, A qualified resident physician was not available and A physician assistant was required during the procedure for retraction tissue handling,dissection and suturing    Patient Disposition:  PACU       SIGNATURE: Arlette Looney DO  DATE: March 9, 2022  TIME: 9:04 PM

## 2022-03-18 ENCOUNTER — OFFICE VISIT (OUTPATIENT)
Dept: OBGYN CLINIC | Facility: CLINIC | Age: 22
End: 2022-03-18

## 2022-03-18 VITALS
WEIGHT: 160 LBS | SYSTOLIC BLOOD PRESSURE: 120 MMHG | BODY MASS INDEX: 27.31 KG/M2 | DIASTOLIC BLOOD PRESSURE: 78 MMHG | HEIGHT: 64 IN

## 2022-03-18 DIAGNOSIS — M67.52 PLICA OF KNEE, LEFT: Primary | ICD-10-CM

## 2022-03-18 DIAGNOSIS — M22.2X2 PATELLOFEMORAL DISORDER OF LEFT KNEE: ICD-10-CM

## 2022-03-18 PROCEDURE — 99024 POSTOP FOLLOW-UP VISIT: CPT | Performed by: ORTHOPAEDIC SURGERY

## 2022-03-18 NOTE — PROGRESS NOTES
Knee Post Operative Visit     Assesment:     23 y o  female 9 days s/p surgical arthroscopy of the left knee with medial plica excision and synovectomy, DOS: 3/9/22, doing well     Plan:    Post-Operative treatment:    Ice to knee for 20 minutes at least 1-2 times daily  PT for ROM/strengthening to knee, hip and core  Elevate and ice knee to help with post operative swelling     Imaging: All imaging from today was reviewed by myself and explained to the patient  Weight bearing:  as tolerated     ROM:  Full    Brace:  No brace needed    DVT Prophylaxis:  Aspirin 325 mg oral twice daily x 3 weeks    Follow up:   6 weeks    Patient was advised that if they have any fevers, chills, chest pain, shortness of breath, redness or drainage from the incision, please let our office know immediately  Chief Complaint   Patient presents with    Left Knee - Post-op     History of Present Illness: The patient is a 23 y o  female who is being evaluated post operatively 9 days s/p surgical arthroscopy of the left knee with medial plica excision and synovectomy, DOS: 3/9/22    Since the prior visit, She reports significant improvement from the pain from prior to surgery  She has been having some post operative pain and swelling in the knee  Pain is well controlled  The patient is using ice to control swelling  They have started physical therapy and is attending 2 days a week  The patient Aspirin 325 mg oral twice daily x 3 weeks for DVT ppx  The patient has been ambulating without crutches  The patient has been ambulating without a brace  The patient denies any fevers, chills, calf pain, chest pain/shortness of breath, redness or drainage from the incision  I have reviewed the past medical, surgical, social and family history, medications and allergies as documented in the EMR  Review of systems: ROS is negative other than that noted in the HPI    Constitutional: Negative for fatigue and fever         Physical Exam:    Blood pressure 120/78, height 5' 4" (1 626 m), weight 72 6 kg (160 lb)  General/Constitutional: NAD, well developed, well nourished  HENT: Normocephalic, atraumatic  CV: Intact distal pulses, regular rate  Resp: No respiratory distress or labored breathing  Lymphatic: No lymphadenopathy palpated  Neuro: Alert and Oriented x 3, no focal deficits  Psych: Normal mood, normal affect, normal judgement, normal behavior  Skin: Warm, dry, no rashes, no erythema      Knee Exam (focused):                   RIGHT LEFT   ROM:   0-130 0-130   Palpation: Effusion negative negative     MJL tenderness Negative Negative     LJL tenderness Negative Negative   Instability: Varus stable stable     Valgus stable stable   Special Tests: Lachman Negative Negative     Posterior drawer Negative Negative     Anterior drawer Negative Negative     Pivot shift not tested not tested     Dial not tested not tested   Patella: Palpation no tenderness no tenderness     Mobility 1/4 1/4     Apprehension Negative Negative   Other: Single leg 1/4 squat not tested not tested      Incisions show no erythema, no drainage    LE NV Exam: +2 DP/PT pulses bilaterally  Sensation intact to light touch L2-S1 bilaterally     Bilateral hip ROM demonstrates no pain actively or passively    No calf tenderness to palpation bilaterally    Scribe Attestation    I,:  Luciano Watson am acting as a scribe while in the presence of the attending physician :       I,:  Anant Cárdenas, DO personally performed the services described in this documentation    as scribed in my presence :

## 2022-04-29 ENCOUNTER — OFFICE VISIT (OUTPATIENT)
Dept: OBGYN CLINIC | Facility: CLINIC | Age: 22
End: 2022-04-29

## 2022-04-29 VITALS
DIASTOLIC BLOOD PRESSURE: 76 MMHG | HEIGHT: 64 IN | SYSTOLIC BLOOD PRESSURE: 124 MMHG | BODY MASS INDEX: 30.73 KG/M2 | WEIGHT: 180 LBS

## 2022-04-29 DIAGNOSIS — M67.52 PLICA OF KNEE, LEFT: Primary | ICD-10-CM

## 2022-04-29 PROCEDURE — 99024 POSTOP FOLLOW-UP VISIT: CPT | Performed by: ORTHOPAEDIC SURGERY

## 2022-04-29 NOTE — LETTER
April 29, 2022     Patient: Ada Whitmore  YOB: 2000  Date of Visit: 4/29/2022      To Whom it May Concern:    Ada Whitmore is under my professional care  Judy Dorcas was seen in my office on 4/29/2022  Judy Toscano may return to work on 4/29/2022  If you have any questions or concerns, please don't hesitate to call           Sincerely,          Jo Lin, DO        CC: No Recipients

## 2022-04-29 NOTE — PROGRESS NOTES
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    Imaging: All imaging from today was reviewed by myself and explained to the patient  No imaging was available for review today  Weight bearing:  Full     ROM:  Full    Brace:  No brace needed    DVT Prophylaxis:  Ambulation    Follow up:   As needed    Patient was advised that if they have any fevers, chills, chest pain, shortness of breath, redness or drainage from the incision, please let our office know immediately  Chief Complaint   Patient presents with    Left Knee - Post-op       History of Present Illness: The patient is a 23 y o  female who is being evaluated post operatively 7 weeks status post left knee with medial plica excision and synovectomy   Since the prior visit, She reports significant improvement  Pain is well controlled  The patient is using ice to control swelling  They have started physical therapy- and was released  The patient Ambulation for DVT ppx  The patient has been ambulating without crutches  The patient has been ambulating without a brace  The patient denies any fevers, chills, calf pain, chest pain/shortness of breath, redness or drainage from the incision  I have reviewed the past medical, surgical, social and family history, medications and allergies as documented in the EMR  Review of systems: ROS is negative other than that noted in the HPI  Constitutional: Negative for fatigue and fever  Physical Exam:    Blood pressure 124/76, height 5' 4" (1 626 m), weight 81 6 kg (180 lb)      General/Constitutional: NAD, well developed, well nourished  HENT: Normocephalic, atraumatic  CV: Intact distal pulses, regular rate  Resp: No respiratory distress or labored breathing  Lymphatic: No lymphadenopathy palpated  Neuro: Alert and Oriented x 3, no focal deficits  Psych: Normal mood, normal affect, normal judgement, normal behavior  Skin: Warm, dry, no rashes, no erythema      Knee Exam (focused):                   RIGHT LEFT   ROM:   0-130 0-130   Palpation: Effusion negative negative     MJL tenderness Negative Negative     LJL tenderness Negative Negative   Instability: Varus stable stable     Valgus stable stable   Special Tests: Lachman Negative Negative     Posterior drawer Negative Negative     Anterior drawer Negative Negative     Pivot shift not tested not tested     Dial not tested not tested   Patella: Palpation no tenderness no tenderness     Mobility 1/4 1/4     Apprehension Negative Negative   Other: Single leg 1/4 squat not tested not tested      Incisions show no erythema, no drainage    LE NV Exam: +2 DP/PT pulses bilaterally  Sensation intact to light touch L2-S1 bilaterally     Bilateral hip ROM demonstrates no pain actively or passively    No calf tenderness to palpation bilaterally    Scribe Attestation    I,:  Mike Ojeda am acting as a scribe while in the presence of the attending physician :       I,:  Kelley Cárdenas DO personally performed the services described in this documentation    as scribed in my presence :

## 2022-08-01 ENCOUNTER — APPOINTMENT (OUTPATIENT)
Dept: RADIOLOGY | Facility: CLINIC | Age: 22
End: 2022-08-01
Payer: COMMERCIAL

## 2022-08-01 DIAGNOSIS — M54.2 CERVICALGIA: ICD-10-CM

## 2022-08-01 PROCEDURE — 72050 X-RAY EXAM NECK SPINE 4/5VWS: CPT

## 2024-04-01 ENCOUNTER — APPOINTMENT (EMERGENCY)
Dept: CT IMAGING | Facility: HOSPITAL | Age: 24
End: 2024-04-01
Payer: COMMERCIAL

## 2024-04-01 ENCOUNTER — HOSPITAL ENCOUNTER (EMERGENCY)
Facility: HOSPITAL | Age: 24
Discharge: HOME | End: 2024-04-01
Attending: EMERGENCY MEDICINE | Admitting: EMERGENCY MEDICINE
Payer: COMMERCIAL

## 2024-04-01 VITALS
TEMPERATURE: 98.2 F | RESPIRATION RATE: 20 BRPM | OXYGEN SATURATION: 98 % | SYSTOLIC BLOOD PRESSURE: 133 MMHG | HEART RATE: 99 BPM | DIASTOLIC BLOOD PRESSURE: 80 MMHG

## 2024-04-01 DIAGNOSIS — N39.0 UTI (URINARY TRACT INFECTION): Primary | ICD-10-CM

## 2024-04-01 LAB
ALBUMIN SERPL BCP-MCNC: 4.5 G/DL (ref 3.5–5)
ALP SERPL-CCNC: 71 U/L (ref 34–104)
ALT SERPL W P-5'-P-CCNC: 11 U/L (ref 7–52)
ANION GAP SERPL CALCULATED.3IONS-SCNC: 7 MMOL/L (ref 4–13)
AST SERPL W P-5'-P-CCNC: 11 U/L (ref 13–39)
BACTERIA UR QL AUTO: ABNORMAL /HPF
BASOPHILS # BLD AUTO: 0.04 THOUSANDS/ÂΜL (ref 0–0.1)
BASOPHILS NFR BLD AUTO: 0 % (ref 0–1)
BILIRUB SERPL-MCNC: 0.7 MG/DL (ref 0.2–1)
BILIRUB UR QL STRIP: NEGATIVE
BUN SERPL-MCNC: 7 MG/DL (ref 5–25)
CALCIUM SERPL-MCNC: 9.7 MG/DL (ref 8.4–10.2)
CHLORIDE SERPL-SCNC: 103 MMOL/L (ref 96–108)
CLARITY UR: ABNORMAL
CO2 SERPL-SCNC: 24 MMOL/L (ref 21–32)
COLOR UR: YELLOW
CREAT SERPL-MCNC: 0.91 MG/DL (ref 0.6–1.3)
EOSINOPHIL # BLD AUTO: 0.01 THOUSAND/ÂΜL (ref 0–0.61)
EOSINOPHIL NFR BLD AUTO: 0 % (ref 0–6)
ERYTHROCYTE [DISTWIDTH] IN BLOOD BY AUTOMATED COUNT: 12.2 % (ref 11.6–15.1)
EXT PREGNANCY TEST URINE: NEGATIVE
EXT. CONTROL: NORMAL
GFR SERPL CREATININE-BSD FRML MDRD: 87 ML/MIN/1.73SQ M
GLUCOSE SERPL-MCNC: 118 MG/DL (ref 65–140)
GLUCOSE UR STRIP-MCNC: NEGATIVE MG/DL
HCG SERPL QL: NEGATIVE
HCT VFR BLD AUTO: 44.7 % (ref 34.8–46.1)
HGB BLD-MCNC: 14.1 G/DL (ref 11.5–15.4)
HGB UR QL STRIP.AUTO: ABNORMAL
HOLD SPECIMEN: NORMAL
IMM GRANULOCYTES # BLD AUTO: 0.07 THOUSAND/UL (ref 0–0.2)
IMM GRANULOCYTES NFR BLD AUTO: 1 % (ref 0–2)
KETONES UR STRIP-MCNC: NEGATIVE MG/DL
LEUKOCYTE ESTERASE UR QL STRIP: ABNORMAL
LIPASE SERPL-CCNC: 12 U/L (ref 11–82)
LYMPHOCYTES # BLD AUTO: 1.39 THOUSANDS/ÂΜL (ref 0.6–4.47)
LYMPHOCYTES NFR BLD AUTO: 10 % (ref 14–44)
MCH RBC QN AUTO: 29 PG (ref 26.8–34.3)
MCHC RBC AUTO-ENTMCNC: 31.5 G/DL (ref 31.4–37.4)
MCV RBC AUTO: 92 FL (ref 82–98)
MONOCYTES # BLD AUTO: 0.77 THOUSAND/ÂΜL (ref 0.17–1.22)
MONOCYTES NFR BLD AUTO: 6 % (ref 4–12)
MUCOUS THREADS UR QL AUTO: ABNORMAL
NEUTROPHILS # BLD AUTO: 11.1 THOUSANDS/ÂΜL (ref 1.85–7.62)
NEUTS SEG NFR BLD AUTO: 83 % (ref 43–75)
NITRITE UR QL STRIP: NEGATIVE
NON-SQ EPI CELLS URNS QL MICRO: ABNORMAL /HPF
NRBC BLD AUTO-RTO: 0 /100 WBCS
PH UR STRIP.AUTO: 6.5 [PH]
PLATELET # BLD AUTO: 217 THOUSANDS/UL (ref 149–390)
PMV BLD AUTO: 11.1 FL (ref 8.9–12.7)
POTASSIUM SERPL-SCNC: 4.6 MMOL/L (ref 3.5–5.3)
PROT SERPL-MCNC: 7.9 G/DL (ref 6.4–8.4)
PROT UR STRIP-MCNC: ABNORMAL MG/DL
RBC # BLD AUTO: 4.86 MILLION/UL (ref 3.81–5.12)
RBC #/AREA URNS AUTO: ABNORMAL /HPF
SODIUM SERPL-SCNC: 134 MMOL/L (ref 135–147)
SP GR UR STRIP.AUTO: 1.01 (ref 1–1.03)
UROBILINOGEN UR STRIP-ACNC: <2 MG/DL
WBC # BLD AUTO: 13.38 THOUSAND/UL (ref 4.31–10.16)
WBC #/AREA URNS AUTO: ABNORMAL /HPF
WBC CLUMPS # UR AUTO: PRESENT /UL

## 2024-04-01 PROCEDURE — 74176 CT ABD & PELVIS W/O CONTRAST: CPT

## 2024-04-01 PROCEDURE — 84703 CHORIONIC GONADOTROPIN ASSAY: CPT | Performed by: EMERGENCY MEDICINE

## 2024-04-01 PROCEDURE — 96375 TX/PRO/DX INJ NEW DRUG ADDON: CPT

## 2024-04-01 PROCEDURE — 80053 COMPREHEN METABOLIC PANEL: CPT | Performed by: EMERGENCY MEDICINE

## 2024-04-01 PROCEDURE — 83690 ASSAY OF LIPASE: CPT | Performed by: EMERGENCY MEDICINE

## 2024-04-01 PROCEDURE — 96365 THER/PROPH/DIAG IV INF INIT: CPT

## 2024-04-01 PROCEDURE — 99284 EMERGENCY DEPT VISIT MOD MDM: CPT

## 2024-04-01 PROCEDURE — 99284 EMERGENCY DEPT VISIT MOD MDM: CPT | Performed by: EMERGENCY MEDICINE

## 2024-04-01 PROCEDURE — 81001 URINALYSIS AUTO W/SCOPE: CPT | Performed by: EMERGENCY MEDICINE

## 2024-04-01 PROCEDURE — 96361 HYDRATE IV INFUSION ADD-ON: CPT

## 2024-04-01 PROCEDURE — 87186 SC STD MICRODIL/AGAR DIL: CPT | Performed by: EMERGENCY MEDICINE

## 2024-04-01 PROCEDURE — 87077 CULTURE AEROBIC IDENTIFY: CPT | Performed by: EMERGENCY MEDICINE

## 2024-04-01 PROCEDURE — 85025 COMPLETE CBC W/AUTO DIFF WBC: CPT | Performed by: EMERGENCY MEDICINE

## 2024-04-01 PROCEDURE — 81025 URINE PREGNANCY TEST: CPT | Performed by: EMERGENCY MEDICINE

## 2024-04-01 PROCEDURE — 36415 COLL VENOUS BLD VENIPUNCTURE: CPT

## 2024-04-01 PROCEDURE — 87086 URINE CULTURE/COLONY COUNT: CPT | Performed by: EMERGENCY MEDICINE

## 2024-04-01 RX ORDER — CEFPODOXIME PROXETIL 200 MG/1
200 TABLET, FILM COATED ORAL 2 TIMES DAILY
Qty: 20 TABLET | Refills: 0 | Status: SHIPPED | OUTPATIENT
Start: 2024-04-01 | End: 2024-04-11

## 2024-04-01 RX ORDER — KETOROLAC TROMETHAMINE 30 MG/ML
15 INJECTION, SOLUTION INTRAMUSCULAR; INTRAVENOUS ONCE
Status: COMPLETED | OUTPATIENT
Start: 2024-04-01 | End: 2024-04-01

## 2024-04-01 RX ORDER — CEFTRIAXONE 1 G/50ML
1000 INJECTION, SOLUTION INTRAVENOUS ONCE
Status: COMPLETED | OUTPATIENT
Start: 2024-04-01 | End: 2024-04-01

## 2024-04-01 RX ADMIN — KETOROLAC TROMETHAMINE 15 MG: 30 INJECTION, SOLUTION INTRAMUSCULAR; INTRAVENOUS at 13:32

## 2024-04-01 RX ADMIN — CEFTRIAXONE 1000 MG: 1 INJECTION, SOLUTION INTRAVENOUS at 14:49

## 2024-04-01 RX ADMIN — SODIUM CHLORIDE 1000 ML: 0.9 INJECTION, SOLUTION INTRAVENOUS at 13:37

## 2024-04-01 NOTE — ED PROVIDER NOTES
History  Chief Complaint   Patient presents with    Flank Pain     Thursday was prescribed 3 days abx for UTI. Now c/o left sided flank pain.      24-year-old female with intermittent left flank pain over the past 3 days denies any nausea vomiting diarrhea or urinary symptoms no prior abdominal surgeries.  Denies any fevers or chills or prior history of kidney stones or pyelonephritis.  Patient did just complete a course of Bactrim for urinary tract infection prescribed by her primary care physician      History provided by:  Patient  Medical Problem  Location:  Left leg  Quality:  Achy pain  Severity:  Moderate  Onset quality:  Gradual  Duration:  3 days  Timing:  Intermittent  Progression:  Worsening  Chronicity:  New  Context:  Left flank pain over the past 3 days      Prior to Admission Medications   Prescriptions Last Dose Informant Patient Reported? Taking?   Norgestimate-Eth Estradiol (MONO-LINYAH PO)   Yes No   Sig: Take by mouth   aspirin (ECOTRIN) 325 mg EC tablet   No No   Sig: Take 1 tablet (325 mg total) by mouth daily   escitalopram (LEXAPRO) 5 mg tablet   Yes No   Sig: Take 10 mg by mouth daily at bedtime     ibuprofen (MOTRIN) 600 mg tablet   No No   Sig: Take 1 tablet (600 mg total) by mouth every 6 (six) hours as needed for mild pain   oxyCODONE (ROXICODONE) 5 immediate release tablet   No No   Sig: Take 1 tablet (5 mg total) by mouth every 4 (four) hours as needed for moderate pain for up to 10 doses Max Daily Amount: 30 mg   Patient not taking: Reported on 4/29/2022       Facility-Administered Medications: None       Past Medical History:   Diagnosis Date    Multiple body piercings        Past Surgical History:   Procedure Laterality Date    ADENOIDECTOMY      HERNIA REPAIR      as infant    MI ARTHROSCOPY KNEE SYNOVECTOMY LIMITED SPX Left 3/9/2022    Procedure: ARTHROSCOPY KNEE;  Surgeon: Carlos Cárdenas DO;  Location:  MAIN OR;  Service: Orthopedics    SYNOVECTOMY Left 3/9/2022    Procedure:  SYNOVECTOMY;  Surgeon: Carlos Cárdenas DO;  Location:  MAIN OR;  Service: Orthopedics    TONSILLECTOMY      2 yrs old    WISDOM TOOTH EXTRACTION         Family History   Problem Relation Age of Onset    Hypertension Mother     Hypertension Father      I have reviewed and agree with the history as documented.    E-Cigarette/Vaping    E-Cigarette Use Never User      E-Cigarette/Vaping Substances    Nicotine No     THC No     CBD No     Flavoring No     Other No     Unknown No      Social History     Tobacco Use    Smoking status: Never    Smokeless tobacco: Never   Vaping Use    Vaping status: Never Used   Substance Use Topics    Alcohol use: Yes     Comment: occasionally    Drug use: Never       Review of Systems   Genitourinary:  Positive for flank pain.   All other systems reviewed and are negative.      Physical Exam  Physical Exam  Vitals and nursing note reviewed.   Constitutional:       General: She is not in acute distress.     Appearance: She is not ill-appearing, toxic-appearing or diaphoretic.   HENT:      Head: Normocephalic and atraumatic.      Right Ear: External ear normal.      Left Ear: External ear normal.   Eyes:      General:         Right eye: No discharge.         Left eye: No discharge.      Extraocular Movements: Extraocular movements intact.      Pupils: Pupils are equal, round, and reactive to light.   Cardiovascular:      Rate and Rhythm: Normal rate and regular rhythm.      Pulses: Normal pulses.      Heart sounds: No murmur heard.     No friction rub. No gallop.   Pulmonary:      Effort: Pulmonary effort is normal. No respiratory distress.      Breath sounds: No stridor. No wheezing, rhonchi or rales.   Abdominal:      General: There is no distension.      Palpations: Abdomen is soft.      Tenderness: There is no abdominal tenderness. There is left CVA tenderness. There is no guarding or rebound.   Musculoskeletal:         General: No tenderness or signs of injury.      Cervical back:  Normal range of motion and neck supple. No rigidity or tenderness.      Right lower leg: No edema.      Left lower leg: No edema.   Skin:     General: Skin is warm and dry.      Coloration: Skin is not jaundiced.      Findings: No bruising, erythema or rash.   Neurological:      General: No focal deficit present.      Mental Status: She is alert and oriented to person, place, and time.      Cranial Nerves: No cranial nerve deficit.      Motor: No weakness.   Psychiatric:         Mood and Affect: Mood normal.         Behavior: Behavior normal.         Thought Content: Thought content normal.         Vital Signs  ED Triage Vitals [04/01/24 1231]   Temperature Pulse Respirations Blood Pressure SpO2   98.2 °F (36.8 °C) 99 20 133/80 98 %      Temp Source Heart Rate Source Patient Position - Orthostatic VS BP Location FiO2 (%)   Temporal Monitor Sitting Right arm --      Pain Score       8           Vitals:    04/01/24 1231   BP: 133/80   Pulse: 99   Patient Position - Orthostatic VS: Sitting         Visual Acuity      ED Medications  Medications   cefTRIAXone (ROCEPHIN) IVPB (premix in dextrose) 1,000 mg 50 mL (1,000 mg Intravenous New Bag 4/1/24 1449)   sodium chloride 0.9 % bolus 1,000 mL (0 mL Intravenous Stopped 4/1/24 1437)   ketorolac (TORADOL) injection 15 mg (15 mg Intravenous Given 4/1/24 1332)       Diagnostic Studies  Results Reviewed       Procedure Component Value Units Date/Time    Urine Microscopic [[id number]]  (Abnormal) Collected: 04/01/24 1332    Lab Status: Final result Specimen: Urine, Clean Catch Updated: 04/01/24 1421     RBC, UA None Seen /hpf      WBC, UA Innumerable /hpf      Epithelial Cells Moderate /hpf      Bacteria, UA Moderate /hpf      MUCUS THREADS Occasional     WBC Clumps Present    Urine culture [[id number]] Collected: 04/01/24 1332    Lab Status: In process Specimen: Urine, Clean Catch Updated: 04/01/24 1418    UA w Reflex to Microscopic w Reflex to Culture [[id number]]  (Abnormal)  Collected: 04/01/24 1332    Lab Status: Final result Specimen: Urine, Clean Catch Updated: 04/01/24 1416     Color, UA Yellow     Clarity, UA Slightly Cloudy     Specific Gravity, UA 1.015     pH, UA 6.5     Leukocytes, UA Large     Nitrite, UA Negative     Protein, UA 30 (1+) mg/dl      Glucose, UA Negative mg/dl      Ketones, UA Negative mg/dl      Urobilinogen, UA <2.0 mg/dl      Bilirubin, UA Negative     Occult Blood, UA Trace    Alden draw [[id number]] Collected: 04/01/24 1235    Lab Status: Final result Specimen: Blood from Arm, Right Updated: 04/01/24 1402    Narrative:      The following orders were created for panel order Alden draw.  Procedure                               Abnormality         Status                     ---------                               -----------         ------                     Light Blue Top on hold[[id number]]                           Final result               Green / Black tube on hold[[id number]]                       Final result                 Please view results for these tests on the individual orders.    POCT pregnancy, urine [[id number]]  (Normal) Resulted: 04/01/24 1338    Lab Status: Final result Updated: 04/01/24 1338     EXT Preg Test, Ur Negative     Control Valid    hCG, qualitative pregnancy [[id number]]  (Normal) Collected: 04/01/24 1235    Lab Status: Final result Specimen: Blood from Arm, Right Updated: 04/01/24 1305     Preg, Serum Negative    Comprehensive metabolic panel [[id number]]  (Abnormal) Collected: 04/01/24 1235    Lab Status: Final result Specimen: Blood from Arm, Right Updated: 04/01/24 1256     Sodium 134 mmol/L      Potassium 4.6 mmol/L      Chloride 103 mmol/L      CO2 24 mmol/L      ANION GAP 7 mmol/L      BUN 7 mg/dL      Creatinine 0.91 mg/dL      Glucose 118 mg/dL      Calcium 9.7 mg/dL      AST 11 U/L      ALT 11 U/L      Alkaline Phosphatase 71 U/L      Total Protein 7.9 g/dL      Albumin 4.5 g/dL      Total Bilirubin 0.70 mg/dL       eGFR 87 ml/min/1.73sq m     Narrative:      National Kid[employer] Foundation guidelines for Chronic Kidney Disease (CKD):     Stage 1 with normal or high GFR (GFR > 90 mL/min/1.73 square meters)    Stage 2 Mild CKD (GFR = 60-89 mL/min/1.73 square meters)    Stage 3A Moderate CKD (GFR = 45-59 mL/min/1.73 square meters)    Stage 3B Moderate CKD (GFR = 30-44 mL/min/1.73 square meters)    Stage 4 Severe CKD (GFR = 15-29 mL/min/1.73 square meters)    Stage 5 End Stage CKD (GFR <15 mL/min/1.73 square meters)  Note: GFR calculation is accurate only with a steady state creatinine    Lipase [[id number]]  (Normal) Collected: 04/01/24 1235    Lab Status: Final result Specimen: Blood from Arm, Right Updated: 04/01/24 1256     Lipase 12 u/L     CBC and differential [[id number]]  (Abnormal) Collected: 04/01/24 1235    Lab Status: Final result Specimen: Blood from Arm, Right Updated: 04/01/24 1242     WBC 13.38 Thousand/uL      RBC 4.86 Million/uL      Hemoglobin 14.1 g/dL      Hematocrit 44.7 %      MCV 92 fL      MCH 29.0 pg      MCHC 31.5 g/dL      RDW 12.2 %      MPV 11.1 fL      Platelets 217 Thousands/uL      nRBC 0 /100 WBCs      Neutrophils Relative 83 %      Immature Grans % 1 %      Lymphocytes Relative 10 %      Monocytes Relative 6 %      Eosinophils Relative 0 %      Basophils Relative 0 %      Neutrophils Absolute 11.10 Thousands/µL      Absolute Immature Grans 0.07 Thousand/uL      Absolute Lymphocytes 1.39 Thousands/µL      Absolute Monocytes 0.77 Thousand/µL      Eosinophils Absolute 0.01 Thousand/µL      Basophils Absolute 0.04 Thousands/µL                    CT renal stone study abdomen pelvis without contrast   Final Result by Tanner Barnard MD (04/01 6268)      Nonobstructing 2 mm right-sided intrarenal calculus. No hydronephrosis on either side.         Workstation performed: [id number]                    Procedures  Procedures         ED Course  ED Course as of 04/01/24 1511   Mon Apr 01, 2024   1502  Patient was informed of the nonobstructing right-sided kidney stone                               SBIRT 22yo+      Flowsheet Row Most Recent Value   Initial Alcohol Screen: US AUDIT-C     1. How often do you have a drink containing alcohol? 0 Filed at: 04/01/2024 1340   2. How many drinks containing alcohol do you have on a typical day you are drinking?  0 Filed at: 04/01/2024 1340   3a. Male UNDER 65: How often do you have five or more drinks on one occasion? 0 Filed at: 04/01/2024 1340   3b. FEMALE Any Age, or MALE 65+: How often do you have 4 or more drinks on one occassion? 0 Filed at: 04/01/2024 1340   Audit-C Score 0 Filed at: 04/01/2024 1340   HEIDI: How many times in the past year have you...    Used an illegal drug or used a prescription medication for non-medical reasons? Never Filed at: 04/01/2024 1340                      Medical Decision Making  Left flank pain differential includes pyelonephritis kidney stone or musculoskeletal pain workup in progress including lab work urinalysis and CAT scan    Amount and/or Complexity of Data Reviewed  Labs: ordered.  Radiology: ordered.    Risk  Prescription drug management.             Disposition  Final diagnoses:   UTI (urinary tract infection) - Probable early pyelonephritis     Time reflects when diagnosis was documented in both MDM as applicable and the Disposition within this note       Time User Action Codes Description Comment    4/1/2024  3:09 PM Jose Hinkle Add [N39.0] UTI (urinary tract infection)     4/1/2024  3:09 PM Jose Hinkle Modify [N39.0] UTI (urinary tract infection) Probable early pyelonephritis          ED Disposition       ED Disposition   Discharge    Condition   Stable    Date/Time   Mon Apr 1, 2024 1509    Comment   Michelle Morris discharge to home/self care.                   Follow-up Information       Follow up With Specialties Details Why Contact Info Additional Information    Dina Chaudhry, DO Family Medicine In 3 days  [address]  [facility] [address]  [phone]        [facility] Emergency Department Emergency Medicine  As needed, If symptoms worsen [address] [facility] [address] [phone] [facility] Emergency Department, [address] [place], [place] [address]            Patient's Medications   Discharge Prescriptions    CEFPODOXIME (VANTIN) 200 MG TABLET    Take 1 tablet (200 mg total) by mouth 2 (two) times a day for 10 days       Start Date: 4/1/2024  End Date: 4/11/2024       Order Dose: 200 mg       Quantity: 20 tablet    Refills: 0       No discharge procedures on file.    PDMP Review         Value Time User    PDMP Reviewed  Yes 3/9/2022  9:49 AM Izabella Banks PA-C            ED Provider  Electronically Signed by             Jose Hinkle DO  04/01/24 2290

## 2024-04-03 LAB — BACTERIA UR CULT: ABNORMAL

## (undated) DEVICE — GLOVE INDICATOR PI UNDERGLOVE SZ 7 BLUE

## (undated) DEVICE — BANDAGE, ESMARK LF STR 6"X9' (20/CS): Brand: CYPRESS

## (undated) DEVICE — DISPOSABLE OR TOWEL: Brand: CARDINAL HEALTH

## (undated) DEVICE — SPONGE SCRUB 4 PCT CHLORHEXIDINE

## (undated) DEVICE — CHLORAPREP HI-LITE 26ML ORANGE

## (undated) DEVICE — CAST PADDING 6 IN STERILE

## (undated) DEVICE — SYRINGE 3ML LL

## (undated) DEVICE — INTENDED FOR TISSUE SEPARATION, AND OTHER PROCEDURES THAT REQUIRE A SHARP SURGICAL BLADE TO PUNCTURE OR CUT.: Brand: BARD-PARKER ® CARBON RIB-BACK BLADES

## (undated) DEVICE — SYRINGE 30ML LL

## (undated) DEVICE — CUFF TOURNIQUET 30 X 4 IN QUICK CONNECT DISP 1BLA

## (undated) DEVICE — BETHLEHEM UNIV MAJ EXT ,KIT: Brand: CARDINAL HEALTH

## (undated) DEVICE — TUBING SUCTION 5MM X 12 FT

## (undated) DEVICE — NEEDLE 18 G X 1 1/2

## (undated) DEVICE — SURGICAL GOWN, XL SMARTSLEEVE: Brand: CONVERTORS

## (undated) DEVICE — NEEDLE SPINAL18G X 3.5 IN QUINCKE

## (undated) DEVICE — ACE WRAP 6 IN UNSTERILE

## (undated) DEVICE — OCCLUSIVE GAUZE STRIP,3% BISMUTH TRIBROMOPHENATE IN PETROLATUM BLEND: Brand: XEROFORM

## (undated) DEVICE — 3M™ STERI-STRIP™ REINFORCED ADHESIVE SKIN CLOSURES, R1547, 1/2 IN X 4 IN (12 MM X 100 MM), 6 STRIPS/ENVELOPE: Brand: 3M™ STERI-STRIP™

## (undated) DEVICE — GLOVE INDICATOR PI UNDERGLOVE SZ 8.5 BLUE

## (undated) DEVICE — SURGI KIT INSTRUMENT ORGANIZER

## (undated) DEVICE — FILTER STRAW 1.7

## (undated) DEVICE — PUDDLE VAC

## (undated) DEVICE — BLADE SHAVER DISSECTOR  4MM 13CM CRV COOLCUT

## (undated) DEVICE — TUBING ARTHROSCOPIC WAVE  MAIN PUMP

## (undated) DEVICE — ARTHROSCOPY FLOOR MAT

## (undated) DEVICE — SUT MONOCRYL 4-0 PS-2 27 IN Y426H